# Patient Record
Sex: MALE | Race: WHITE | HISPANIC OR LATINO | URBAN - METROPOLITAN AREA
[De-identification: names, ages, dates, MRNs, and addresses within clinical notes are randomized per-mention and may not be internally consistent; named-entity substitution may affect disease eponyms.]

---

## 2024-08-19 ENCOUNTER — OUTPATIENT (OUTPATIENT)
Dept: OUTPATIENT SERVICES | Facility: HOSPITAL | Age: 44
LOS: 1 days | End: 2024-08-19
Payer: COMMERCIAL

## 2024-08-19 ENCOUNTER — RESULT REVIEW (OUTPATIENT)
Age: 44
End: 2024-08-19

## 2024-08-19 ENCOUNTER — APPOINTMENT (OUTPATIENT)
Dept: RADIOLOGY | Facility: CLINIC | Age: 44
End: 2024-08-19
Payer: COMMERCIAL

## 2024-08-19 DIAGNOSIS — Z00.00 ENCOUNTER FOR GENERAL ADULT MEDICAL EXAMINATION WITHOUT ABNORMAL FINDINGS: ICD-10-CM

## 2024-08-19 PROCEDURE — 73560 X-RAY EXAM OF KNEE 1 OR 2: CPT | Mod: 26,RT

## 2024-08-19 PROCEDURE — 73600 X-RAY EXAM OF ANKLE: CPT | Mod: 26,RT

## 2024-08-19 PROCEDURE — 73560 X-RAY EXAM OF KNEE 1 OR 2: CPT

## 2024-08-19 PROCEDURE — 73600 X-RAY EXAM OF ANKLE: CPT

## 2024-08-26 ENCOUNTER — APPOINTMENT (OUTPATIENT)
Dept: OTOLARYNGOLOGY | Facility: CLINIC | Age: 44
End: 2024-08-26
Payer: COMMERCIAL

## 2024-08-26 VITALS
HEIGHT: 65 IN | WEIGHT: 170 LBS | HEART RATE: 77 BPM | OXYGEN SATURATION: 95 % | SYSTOLIC BLOOD PRESSURE: 134 MMHG | BODY MASS INDEX: 28.32 KG/M2 | DIASTOLIC BLOOD PRESSURE: 90 MMHG

## 2024-08-26 DIAGNOSIS — Z78.9 OTHER SPECIFIED HEALTH STATUS: ICD-10-CM

## 2024-08-26 DIAGNOSIS — J34.2 DEVIATED NASAL SEPTUM: ICD-10-CM

## 2024-08-26 PROCEDURE — 99203 OFFICE O/P NEW LOW 30 MIN: CPT | Mod: 25

## 2024-08-26 PROCEDURE — 31231 NASAL ENDOSCOPY DX: CPT

## 2024-08-26 NOTE — REASON FOR VISIT
[Initial Consultation] : an initial consultation for [Spouse] : spouse [FreeTextEntry2] : deviated septum

## 2024-08-26 NOTE — PROCEDURE
[FreeTextEntry1] : nasal endoscopy [FreeTextEntry2] : nasal obstruction [FreeTextEntry3] : Procedure: nasal endoscopy   Pre-operative diagnosis:   Indication: Anterior rhinoscopy insufficient to diagnose pathology  Details: After decongestant and lidocaine was sprayed in the bilateral nasal cavities, a flexible laryngoscope was inserted into the right nares. The nasal cavity, middle meatus, ETO, nasopharynx, and glottis were visualized. The endoscope was then inserted into the left nares and the nasal cavity, middle meatus, and ETO was visualized. The patient tolerated procedure well.  Findings: right caudal septal deviation BITH

## 2024-08-26 NOTE — ASSESSMENT
[FreeTextEntry1] : 44 year old male with right caudal septal deviation GLENDY discussed revision septoplasty and BIT.  I discussed the risks, benefits, and alternatives for functional septoplasty. Risks include bleeding, infection, need for revision, postoperative swelling. We discussed risk of septal perforation, persistent nasal obstruction or recurrent nasal obstruction, scarring, synechiae. We also discussed alternatives including continued flonase and breathe right strip use.   I explained the surgery with nasal diagrams. We will plan for a functional septoplasty. We will also plan for inferior turbinate reduction. There are no cosmetic concerns.  Will proceed with booking surgery.

## 2024-08-26 NOTE — HISTORY OF PRESENT ILLNESS
[de-identified] : 44 year old man presents for initial evaluation for deviated septum and difficulty breathing that has gotten worse in the past year. Referred by PCP Hx of septoplasty about 30 years ago in [facility] Reports nasal congestion R>L. Denies anterior rhinorrhea, post nasal drip, sinus pain/pressure, epistaxis, recent fevers or sinus infections.  Sense of smell is good, 10/10 as per patient Recurrent sinus infections: No Denies use of nasal sprays, sinus rinses or OTC allergy medication.  Scans: No recent imaging

## 2024-09-25 ENCOUNTER — APPOINTMENT (OUTPATIENT)
Dept: UROLOGY | Facility: CLINIC | Age: 44
End: 2024-09-25
Payer: COMMERCIAL

## 2024-09-25 DIAGNOSIS — Z30.09 ENCOUNTER FOR OTHER GENERAL COUNSELING AND ADVICE ON CONTRACEPTION: ICD-10-CM

## 2024-09-25 PROCEDURE — 99204 OFFICE O/P NEW MOD 45 MIN: CPT

## 2024-09-27 PROBLEM — Z30.09 VASECTOMY EVALUATION: Status: ACTIVE | Noted: 2024-09-27

## 2024-09-27 NOTE — PHYSICAL EXAM
[Normal Appearance] : normal appearance [Edema] : no peripheral edema [Bowel Sounds] : normal bowel sounds [Epididymis] : the epididymides were normal [Urethral Meatus] : meatus normal [Testes Tenderness] : no tenderness of the testes [Testes Mass (___cm)] : there were no testicular masses [] : no rash [No Focal Deficits] : no focal deficits [de-identified] : vas easy to palpate b/l

## 2024-09-27 NOTE — HISTORY OF PRESENT ILLNESS
[FreeTextEntry1] : CELIA MCGRAW is a 44 year M who presents today as a new patient evaluation for vas eval   3 kids - 26, 17, 21  - wife with IUD - does not want to use anymore Not on blood thinners.  No prior surgeries No medical problems History of hand and nasal surgery Certain he would like a vasectomy

## 2024-09-27 NOTE — ASSESSMENT
[FreeTextEntry1] : 44 y.o. M who desires vasectomy  I counseled the patient extensively today with regard to vasectomy. I discussed the potential risks and benefits of the procedure with the patient including the potential for bleeding and infection. I also discussed the fact that this procedure should be considered irreversible and if there is any question in the patient's mind, I have encouraged him to reconsider his decision to move forward with the procedure. I also discussed the fact that he may remain temporarily fertile for a period of up to 3 months after undergoing the procedure and that a post vasectomy semen analysis would be required to confirm the absence of sperm in the ejaculate.  I discussed options of performing the procedure either under local anesthesia in the office versus under a more general IV sedation in the operating room.  He is uncertain if he would like to proceed in the OR or in the office.  During the visit today, the patient signed the initial consent form with 30-day lead time for the procedure understanding that if he does ultimately decide to change his mind regarding vasectomy, he can do so at any time.

## 2024-11-05 ENCOUNTER — APPOINTMENT (OUTPATIENT)
Dept: UROLOGY | Facility: CLINIC | Age: 44
End: 2024-11-05
Payer: COMMERCIAL

## 2024-11-05 ENCOUNTER — LABORATORY RESULT (OUTPATIENT)
Age: 44
End: 2024-11-05

## 2024-11-05 VITALS
HEIGHT: 65 IN | SYSTOLIC BLOOD PRESSURE: 130 MMHG | HEART RATE: 90 BPM | RESPIRATION RATE: 16 BRPM | BODY MASS INDEX: 28.32 KG/M2 | OXYGEN SATURATION: 96 % | TEMPERATURE: 98 F | DIASTOLIC BLOOD PRESSURE: 88 MMHG | WEIGHT: 170 LBS

## 2024-11-05 DIAGNOSIS — Z30.09 ENCOUNTER FOR OTHER GENERAL COUNSELING AND ADVICE ON CONTRACEPTION: ICD-10-CM

## 2024-11-05 PROCEDURE — 55250 REMOVAL OF SPERM DUCT(S): CPT
